# Patient Record
Sex: FEMALE | Race: WHITE | NOT HISPANIC OR LATINO | Employment: PART TIME | URBAN - METROPOLITAN AREA
[De-identification: names, ages, dates, MRNs, and addresses within clinical notes are randomized per-mention and may not be internally consistent; named-entity substitution may affect disease eponyms.]

---

## 2024-05-20 ENCOUNTER — TELEPHONE (OUTPATIENT)
Dept: HEALTH INFORMATION MANAGEMENT | Facility: OTHER | Age: 74
End: 2024-05-20
Payer: MEDICARE

## 2024-09-05 ENCOUNTER — TELEPHONE (OUTPATIENT)
Dept: HEALTH INFORMATION MANAGEMENT | Facility: OTHER | Age: 74
End: 2024-09-05
Payer: MEDICARE

## 2025-01-31 ENCOUNTER — TELEPHONE (OUTPATIENT)
Dept: CARDIOLOGY | Facility: MEDICAL CENTER | Age: 75
End: 2025-01-31
Payer: MEDICARE

## 2025-02-01 NOTE — TELEPHONE ENCOUNTER
Spoke to patient in regards to records for NP appointment with  [facility].     Patient has seen a cardiologist before?  No, but was in the ER at [facility]      Any recent cardiac testing outside of [facility]? No       Were any records requested?  No

## 2025-02-12 ENCOUNTER — APPOINTMENT (OUTPATIENT)
Dept: CARDIOLOGY | Facility: PHYSICIAN GROUP | Age: 75
End: 2025-02-12
Payer: MEDICARE

## 2025-05-02 ENCOUNTER — TELEPHONE (OUTPATIENT)
Dept: CARDIOLOGY | Facility: MEDICAL CENTER | Age: 75
End: 2025-05-02
Payer: COMMERCIAL

## 2025-05-13 ENCOUNTER — APPOINTMENT (OUTPATIENT)
Dept: CARDIOLOGY | Facility: PHYSICIAN GROUP | Age: 75
End: 2025-05-13
Payer: MEDICARE

## 2025-05-13 VITALS
DIASTOLIC BLOOD PRESSURE: 70 MMHG | HEART RATE: 66 BPM | RESPIRATION RATE: 14 BRPM | WEIGHT: 149 LBS | SYSTOLIC BLOOD PRESSURE: 118 MMHG | BODY MASS INDEX: 27.42 KG/M2 | OXYGEN SATURATION: 99 % | HEIGHT: 62 IN

## 2025-05-13 DIAGNOSIS — Z95.0 CARDIAC PACEMAKER IN SITU: ICD-10-CM

## 2025-05-13 DIAGNOSIS — I45.5 SINUS PAUSE: ICD-10-CM

## 2025-05-13 DIAGNOSIS — I77.810 ECTATIC THORACIC AORTA (HCC): Chronic | ICD-10-CM

## 2025-05-13 DIAGNOSIS — E78.5 DYSLIPIDEMIA: ICD-10-CM

## 2025-05-13 DIAGNOSIS — I10 PRIMARY HYPERTENSION: ICD-10-CM

## 2025-05-13 PROBLEM — I26.93 SINGLE SUBSEGMENTAL PULMONARY EMBOLISM WITHOUT ACUTE COR PULMONALE (HCC): Status: ACTIVE | Noted: 2025-01-03

## 2025-05-13 PROBLEM — E03.9 HYPOTHYROID: Status: ACTIVE | Noted: 2025-05-13

## 2025-05-13 PROBLEM — I71.21 ANEURYSM OF ASCENDING AORTA WITHOUT RUPTURE (HCC): Status: ACTIVE | Noted: 2025-01-03

## 2025-05-13 PROCEDURE — 3078F DIAST BP <80 MM HG: CPT | Performed by: INTERNAL MEDICINE

## 2025-05-13 PROCEDURE — 99204 OFFICE O/P NEW MOD 45 MIN: CPT | Performed by: INTERNAL MEDICINE

## 2025-05-13 PROCEDURE — 3074F SYST BP LT 130 MM HG: CPT | Performed by: INTERNAL MEDICINE

## 2025-05-13 RX ORDER — FUROSEMIDE 20 MG/1
20 TABLET ORAL DAILY
Qty: 90 TABLET | Refills: 3
Start: 2025-05-13

## 2025-05-13 RX ORDER — ROSUVASTATIN CALCIUM 20 MG/1
40 TABLET, COATED ORAL NIGHTLY
COMMUNITY

## 2025-05-13 RX ORDER — LIOTHYRONINE SODIUM 5 UG/1
5 TABLET ORAL DAILY
COMMUNITY

## 2025-05-13 RX ORDER — CELECOXIB 100 MG/1
100 CAPSULE ORAL 2 TIMES DAILY
COMMUNITY

## 2025-05-13 RX ORDER — MONTELUKAST SODIUM 10 MG/1
10 TABLET ORAL DAILY
COMMUNITY

## 2025-05-13 RX ORDER — HYDROXYZINE HYDROCHLORIDE 10 MG/1
10 TABLET, FILM COATED ORAL DAILY
COMMUNITY

## 2025-05-13 NOTE — PROGRESS NOTES
Chief Complaint   Patient presents with    New Patient     Per referral:  Abnormal findings on diagnostic imaging of heart and coronary circulation       Subjective     Belkis Spain is a 74 y.o. female who presents today  in consultation from Douglas Portillo P.A.-C. for evaluation of PPM placed in 1/2025 in setting of PE and COVID    Doing well    Stopped eliquis        Past Medical History:   Diagnosis Date    Asthma     Breast cancer (HCC)     Bronchitis     Chickenpox     GERD (gastroesophageal reflux disease)     Khmer measles     Hypothyroidism     Influenza     Pneumonia     Tonsillitis     Whooping cough      Past Surgical History:   Procedure Laterality Date    APPENDECTOMY      CHOLECYSTECTOMY      HERNIA REPAIR      HYSTERECTOMY LAPAROSCOPY      MASTECTOMY      TONSILLECTOMY       Family History   Problem Relation Age of Onset    Heart Disease Mother     Hypertension Mother     Alzheimer's Disease Mother     Heart Attack Father     Heart Attack Sister     Heart Attack Brother      Social History     Socioeconomic History    Marital status: Unknown     Spouse name: Not on file    Number of children: Not on file    Years of education: Not on file    Highest education level: Not on file   Occupational History    Not on file   Tobacco Use    Smoking status: Never    Smokeless tobacco: Never   Substance and Sexual Activity    Alcohol use: Yes     Comment: wine    Drug use: No    Sexual activity: Not on file   Other Topics Concern    Not on file   Social History Narrative    Not on file     Social Drivers of Health     Financial Resource Strain: Not on file   Food Insecurity: No Food Insecurity (1/7/2025)    Received from [facility]    Hunger Vital Sign     Worried About Running Out of Food in the Last Year: Never true     Ran Out of Food in the Last Year: Never true   Transportation Needs: No Transportation Needs (1/3/2025)    Received from [facility]  [facility] - Transportation     Lack of Transportation (Medical): No     Lack of Transportation (Non-Medical): No   Physical Activity: Not on file   Stress: Not on file   Social Connections: Not on file   Intimate Partner Violence: Not on file   Housing Stability: Not At Risk (1/3/2025)    Received from [facility]    Housing Stability     What is your living situation today?: I have a steady place to live     Think about the place you live. Do you have problems with any of the following?: None of the above     Allergies   Allergen Reactions    Codeine Nausea     Other Reaction(s): [id number]    Latex      Other Reaction(s): [id number]     Outpatient Encounter Medications as of 5/13/2025   Medication Sig Dispense Refill    rosuvastatin (CRESTOR) 20 MG Tab Take 40 mg by mouth every evening.      hydrOXYzine HCl (ATARAX) 10 MG Tab Take 10 mg by mouth every day.      montelukast (SINGULAIR) 10 MG Tab Take 10 mg by mouth every day.      liothyronine (CYTOMEL) 5 MCG Tab Take 5 mcg by mouth every day.      celecoxib (CELEBREX) 100 MG Cap Take 100 mg by mouth 2 times a day.      lisinopril (PRINIVIL) 10 MG Tab Take 10 mg by mouth every day. (Patient not taking: Reported on 5/13/2025)      Levothyroxine Sodium 125 MCG Cap Take  by mouth. (Patient taking differently: Take 75 mcg by mouth.)      MELOXICAM PO Take  by mouth. (Patient not taking: Reported on 5/13/2025)      gabapentin (NEURONTIN) 300 MG Cap Take 300 mg by mouth 3 times a day. (Patient not taking: Reported on 5/13/2025)      fluticasone-salmeterol (ADVAIR DISKUS) 250-50 MCG/DOSE AEROSOL POWDER, BREATH ACTIVATED Inhale 1 Puff by mouth every 12 hours. (Patient not taking: Reported on 5/13/2025)      albuterol (PROAIR HFA) 108 (90 BASE) MCG/ACT Aero Soln inhalation aerosol Inhale 2 Puffs by mouth every 6 hours as needed for Shortness of Breath.      furosemide (LASIX) 20 MG Tab Take 20 mg by mouth 2 times a day.       "esomeprazole (NEXIUM) 40 MG delayed-release capsule Take 40 mg by mouth every morning before breakfast. (Patient not taking: Reported on 2025)      LOVASTATIN PO Take  by mouth. (Patient not taking: Reported on 2025)       No facility-administered encounter medications on file as of 2025.     ROS           Objective     /70 (BP Location: Left arm, Patient Position: Sitting, BP Cuff Size: Adult)   Pulse 66   Resp 14   Ht 1.575 m (5' 2\")   Wt 67.6 kg (149 lb)   SpO2 99%   BMI 27.25 kg/m²     Physical Exam       On 2025, the patient had a presyncopal event with a 7.2 second pause. Cardiology was consulted. Given her symptoms, the decision was made to place a pacemaker. A pacemaker was successfully placed on 2025, the procedure was uncomplicated.     Echo Routine    Result Date: 2025  Narrative: Study ID: [id number] [facility] [address] [place] [id number] [phone] ADULT ECHOCARDIOGRAM REPORT Name: ADOLFO LINTON CAE Study Date: 2025 08:06 AM MRN: [id number] Patient Location: ^5 ^02^[facility] : 1950 (M/d/yyyy) Gender: Female Height: 62 in Age: 74 yrs Weight: 139 lb Account #: [id number] BSA: 1.6 m2 Interpretation Summary A complete two-dimensional transthoracic echocardiogram was performed (2D, M-mode, Doppler and color flow Doppler). Ejection Fraction = >55%. There is mild mitral annular calcification. Mild aortic sclerosis without stenosis There is no pericardial effusion. Left Ventricle Ejection Fraction = >55%. Mitral Valve There is mild mitral annular calcification. Aortic Valve Mild aortic sclerosis without stenosis. Pericardium/Pleural There is no pericardial effusion. MMode/2D Measurements & Calculations IVSd: 1.1 cm LVIDd: 3.2 cm EDV(Teich): 41.0 ml Ao root diam: 3.0 cm LVPWd: 1.1 cm ACS: 1.7 cm LA dimension: 3.4 cm LVOT diam: 1.9 cm Doppler Measurements & Calculations MV E max min: " 45.8 cm/sec MV dec slope: 134.0 cm/sec2 LV V1 max P.8 mmHg SV(LVOT): 41.4 ml MV A max min: 53.6 cm/sec MV dec time: 0.34 sec LV V1 mean P.0 mmHg MV E/A: 0.85 LV V1 max: 67.9 cm/sec LV V1 mean: 42.8 cm/sec LV V1 VTI: 14.6 cm MV P1/2t-pr_phl: 100.0 msec QLAB Heart Model EDV (HM)_phl: 141.0 ml EF (HM)_phl: 59.0 % ESV (HM)_phl: 58.0 ml HR (HM)_phl: 64.0 BPM LV Length ED (HM)_phl: 92.0 mm SV (HM)_phl: 83.0 ml LV Length ES (HM)_phl: 78.0 mm ED Current (HM)_phl: 45.0 % ES Current ()_phl: 51.0 % ED Default ()_phl: 60.0 % ES Default (HM)_phl: 30.0 % ______________________________________________________________________________ Electronically authenticated by:Reginald Owusu M.D. on 2025 10:37 AM Ordering Physician: MANOLO GLEZ Performed By: Mauri English R.D.C.S.     Result Date: 2025  Narrative: .DATE OF PROCEDURE: 25 ATTENDING FOR PROCEDURE: Dr Reginald Owusu MD Pre-op diagnosis: Symptomatic bradycardia Sick sinus syndrome Post-op diagnosis: Same PROCEDURES: 1. Implantation of dual chamber permanent pacemaker 2.Cardiac fluoroscopy 3. Left subclavian venogram COMPLICATIONS: None BLOOD LOSS: 20 cc ALL RISKS AND BENEFITS OF THE PROCEDURE EXPLAINED IN DETAIL AND PATIENT IS AGREEABLE WITH PROCEDURE DETAILS OF THE PROCEDURE: The patient was brought to the laboratory in a fasting, non-sedated state. Informed consent was obtained. The left chest was prepped and draped in the usual sterile fashion. A time out was performed before the procedure. Sedation was administered per anesthesia. 2% lidocaine was used for local anesthesia. After left subclavian venogram, subclavian vein was punctured with micropuncture then small incision made and pocket was created The ventricular and atrial leads were placed at the level of the mid right ventricular apical septum and the right atrial appendage respectively without difficulty and screwed into place under fluoroscopic guidance. The leads demonstrated good pacing and  sensing function, parameters are as listed below. The leads were sutured to the pectoralis muscle. A pocket was created in the plane of the pectoralis fascia using a combination of sharp and blunt dissection. The pacemaker pocket was irrigated with normal saline. The leads were connected to the device and then the device was placed in the pocket. The pocket was closed using three layers of 2.0 Vicryl suture. The patient left the laboratory in stable condition. The attending was present for the entire procedure. PARAMETERS: 1. Device: Abbott PPM 2. Right Ventricular Lead: Abbott Tendril R wave 12mV capture 0.5@0.5 impedance 730 3. Right Atrial Lead: Abbott tendril P wave 1.5 capture 0.5@0.5 impedance 380       Assessment & Plan     1. Cardiac pacemaker in situ        2. Sinus pause        3. Primary hypertension        4. Dyslipidemia        5. Ectatic thoracic aorta (HCC)  EC-ECHOCARDIOGRAM COMPLETE W/O CONT          Medical Decision Making: Today's Assessment/Status/Plan:        It was my pleasure to meet with Ms. Spain.    We addressed the management of hypertension at today's visit. Blood pressure is well controlled.  We specifically assessed the labs on hypertension treatment    We addressed the management of dyslipidemia at today's visit. She is on appropriate lipid lowering medication.      Furosemide has been long term unclear if has CHF    I personally reviewed in person with the patient her most recent pacemaker check it is functioning appropriately.    I will see Ms. Spain back in 1 year time and encouraged her to follow up with us over the phone or electronically using my MyChart as issues arise.    It is my pleasure to participate in the care of Ms. Spain.  Please do not hesitate to contact me with questions or concerns.    Scott Ortiz MD PhD FAC  Cardiologist [facility]    Please note that this dictation was created using voice recognition software. There  may be errors I did not discover before finalizing the note.

## 2025-05-13 NOTE — PATIENT INSTRUCTIONS
Work on at least 1.5 hours a week of moderate exercise (typical brisk walking or similar activity)    Please look into the following diets and incorporate them into your diet    LOW SALT DIET   KEEP YOUR SODIUM EQUAL TO CALORIES AND NO MORE THAN DOUBLE THE CALORIES FOR A LOW SALT DIET    Cardiosmart.org - great resource for American College of Cardiology on heart disease prevention and treatment    Please work on increasing these foods in your diet to increase your potassium levels    Highest potassium foods  Dried figs, molasses, seaweed    High potassium foods  Dried fruits (dates, prunes), nuts, avocados, bran cereal, wheat germ, lima beans    Potassium-rich food  Vegetables-spinach, tomatoes, broccoli, winter squash, beets, carrots, cauliflower, potatoes    Fruits-bananas, cantaloupe, kiwis, oranges, mangoes    Meats-ground beef, steak, pork, veal, lamb    *Adapted: Simin KRISHNA. Hypokalemia. [place] Journal of Medicine 1998; 339:451.

## 2025-05-29 ENCOUNTER — NON-PROVIDER VISIT (OUTPATIENT)
Dept: CARDIOLOGY | Facility: PHYSICIAN GROUP | Age: 75
End: 2025-05-29
Payer: COMMERCIAL

## 2025-05-29 VITALS
DIASTOLIC BLOOD PRESSURE: 66 MMHG | SYSTOLIC BLOOD PRESSURE: 114 MMHG | BODY MASS INDEX: 27.79 KG/M2 | HEART RATE: 72 BPM | HEIGHT: 62 IN | RESPIRATION RATE: 12 BRPM | OXYGEN SATURATION: 95 % | WEIGHT: 151 LBS

## 2025-05-29 DIAGNOSIS — Z95.0 CARDIAC PACEMAKER IN SITU: Primary | ICD-10-CM

## 2025-05-29 DIAGNOSIS — I45.5 SINUS PAUSE: ICD-10-CM

## 2025-05-29 NOTE — PROGRESS NOTES
Patient had PM implanted on 1/12/2025 at [facility] in [place], [place] for sinus pauses in the setting of Covid and PE (treated with DOAC x 3 months).    She did see Dr. Ortiz on 5/13/2025 to establish care here in [place].    Device is working normally. No mode switching episodes.  Stable sensing and capture of RA and RV leads (RV capture if high at 3.0V, but stable; she is also only RV paced <1% of total time); stable impedances. Battery longevity is 7.2-11.4 years.  No changes are made today.    I will try to get her a home remote monitor.    Follow-up in 6 months for next PM check with me.  If she is monitored remotely, I can see her every 12 months.

## 2025-06-18 ENCOUNTER — ANCILLARY PROCEDURE (OUTPATIENT)
Facility: MEDICAL CENTER | Age: 75
End: 2025-06-18
Attending: INTERNAL MEDICINE
Payer: MEDICARE

## 2025-06-18 DIAGNOSIS — I77.810 ECTATIC THORACIC AORTA (HCC): Chronic | ICD-10-CM

## 2025-06-18 PROCEDURE — 93306 TTE W/DOPPLER COMPLETE: CPT

## 2025-06-23 ENCOUNTER — RESULTS FOLLOW-UP (OUTPATIENT)
Dept: CARDIOLOGY | Facility: MEDICAL CENTER | Age: 75
End: 2025-06-23

## 2025-06-23 LAB
LV EJECT FRACT  99904: 60
LV EJECT FRACT MOD 2C 99903: 57.07
LV EJECT FRACT MOD 4C 99902: 59.66

## 2025-06-23 PROCEDURE — 93306 TTE W/DOPPLER COMPLETE: CPT | Mod: 26 | Performed by: INTERNAL MEDICINE

## 2025-06-24 NOTE — TELEPHONE ENCOUNTER
----- Message from Physician Scott Ortiz M.D. sent at 6/23/2025 10:18 PM PDT -----  The echo looks good, please let her know     Thank you    ----- Message -----  From: Jesús Cardenas  Sent: 6/18/2025   3:10 PM PDT  To: Scott Ortiz M.D.

## 2025-06-24 NOTE — TELEPHONE ENCOUNTER
Phone Number Called: [phone]    Call outcome: Did not leave a detailed message. Requested patient to call back.    Message: Called to discuss echo result - awaiting call back     Attempt X2 left message - awaiting call back.